# Patient Record
Sex: FEMALE | Race: BLACK OR AFRICAN AMERICAN
[De-identification: names, ages, dates, MRNs, and addresses within clinical notes are randomized per-mention and may not be internally consistent; named-entity substitution may affect disease eponyms.]

---

## 2017-01-16 ENCOUNTER — HOSPITAL ENCOUNTER (OUTPATIENT)
Dept: HOSPITAL 62 - OD | Age: 32
End: 2017-01-16
Attending: INTERNAL MEDICINE
Payer: MEDICAID

## 2017-01-16 DIAGNOSIS — Z72.51: ICD-10-CM

## 2017-01-16 DIAGNOSIS — N25.89: ICD-10-CM

## 2017-01-16 DIAGNOSIS — Z11.3: ICD-10-CM

## 2017-01-16 DIAGNOSIS — Z79.899: Primary | ICD-10-CM

## 2017-01-16 LAB
ALBUMIN SERPL-MCNC: 4 G/DL (ref 3.5–5)
ALP SERPL-CCNC: 99 U/L (ref 38–126)
ALT SERPL-CCNC: 24 U/L (ref 9–52)
ANION GAP SERPL CALC-SCNC: 9 MMOL/L (ref 5–19)
AST SERPL-CCNC: 27 U/L (ref 14–36)
BASOPHILS # BLD AUTO: 0 10^3/UL (ref 0–0.2)
BASOPHILS NFR BLD AUTO: 0.5 % (ref 0–2)
BILIRUB DIRECT SERPL-MCNC: 0 MG/DL (ref 0–0.3)
BILIRUB SERPL-MCNC: 0.5 MG/DL (ref 0.2–1.3)
BUN SERPL-MCNC: 7 MG/DL (ref 7–20)
CALCIUM: 9.1 MG/DL (ref 8.4–10.2)
CHLORIDE SERPL-SCNC: 102 MMOL/L (ref 98–107)
CHOLEST SERPL-MCNC: 192.41 MG/DL (ref 0–200)
CO2 SERPL-SCNC: 29 MMOL/L (ref 22–30)
CREAT SERPL-MCNC: 0.72 MG/DL (ref 0.52–1.25)
DIRECT HDL: 37 MG/DL (ref 40–?)
EOSINOPHIL # BLD AUTO: 0.2 10^3/UL (ref 0–0.6)
EOSINOPHIL NFR BLD AUTO: 2.9 % (ref 0–6)
ERYTHROCYTE [DISTWIDTH] IN BLOOD BY AUTOMATED COUNT: 13.7 % (ref 11.5–14)
GLUCOSE SERPL-MCNC: 73 MG/DL (ref 75–110)
HCT VFR BLD CALC: 41.3 % (ref 36–47)
HGB BLD-MCNC: 13.3 G/DL (ref 12–15.5)
HGB HCT DIFFERENCE: -1.4
LDLC SERPL DIRECT ASSAY-MCNC: 144 MG/DL (ref ?–100)
LYMPHOCYTES # BLD AUTO: 2.6 10^3/UL (ref 0.5–4.7)
LYMPHOCYTES NFR BLD AUTO: 41.7 % (ref 13–45)
MCH RBC QN AUTO: 30.3 PG (ref 27–33.4)
MCHC RBC AUTO-ENTMCNC: 32.2 G/DL (ref 32–36)
MCV RBC AUTO: 94 FL (ref 80–97)
MONOCYTES # BLD AUTO: 0.6 10^3/UL (ref 0.1–1.4)
MONOCYTES NFR BLD AUTO: 9.3 % (ref 3–13)
NEUTROPHILS # BLD AUTO: 2.8 10^3/UL (ref 1.7–8.2)
NEUTS SEG NFR BLD AUTO: 45.6 % (ref 42–78)
POTASSIUM SERPL-SCNC: 4.7 MMOL/L (ref 3.6–5)
PROT SERPL-MCNC: 7.5 G/DL (ref 6.3–8.2)
RBC # BLD AUTO: 4.39 10^6/UL (ref 3.72–5.28)
SODIUM SERPL-SCNC: 140.1 MMOL/L (ref 137–145)
TRIGL SERPL-MCNC: 83 MG/DL (ref ?–150)
VLDLC SERPL CALC-MCNC: 17 MG/DL (ref 10–31)
WBC # BLD AUTO: 6.2 10^3/UL (ref 4–10.5)

## 2017-01-16 PROCEDURE — 84702 CHORIONIC GONADOTROPIN TEST: CPT

## 2017-01-16 PROCEDURE — 84443 ASSAY THYROID STIM HORMONE: CPT

## 2017-01-16 PROCEDURE — 80061 LIPID PANEL: CPT

## 2017-01-16 PROCEDURE — 83036 HEMOGLOBIN GLYCOSYLATED A1C: CPT

## 2017-01-16 PROCEDURE — 86701 HIV-1ANTIBODY: CPT

## 2017-01-16 PROCEDURE — 84560 ASSAY OF URINE/URIC ACID: CPT

## 2017-01-16 PROCEDURE — 86592 SYPHILIS TEST NON-TREP QUAL: CPT

## 2017-01-16 PROCEDURE — 80053 COMPREHEN METABOLIC PANEL: CPT

## 2017-01-16 PROCEDURE — 36415 COLL VENOUS BLD VENIPUNCTURE: CPT

## 2017-01-16 PROCEDURE — 83970 ASSAY OF PARATHORMONE: CPT

## 2017-01-16 PROCEDURE — 85025 COMPLETE CBC W/AUTO DIFF WBC: CPT

## 2017-01-17 LAB
URATE 24H UR-MCNC: 52.1 MG/DL
URATE 24H UR-MRATE: 771.1 MG/24 HR (ref 250–750)

## 2017-01-23 ENCOUNTER — HOSPITAL ENCOUNTER (OUTPATIENT)
Dept: HOSPITAL 62 - OD | Age: 32
End: 2017-01-23
Attending: INTERNAL MEDICINE
Payer: MEDICAID

## 2017-01-23 DIAGNOSIS — Z79.899: Primary | ICD-10-CM

## 2017-01-23 DIAGNOSIS — N20.0: ICD-10-CM

## 2017-01-23 PROCEDURE — 82507 ASSAY OF CITRATE: CPT

## 2017-01-23 PROCEDURE — 83970 ASSAY OF PARATHORMONE: CPT

## 2017-01-23 PROCEDURE — 36415 COLL VENOUS BLD VENIPUNCTURE: CPT

## 2017-01-23 PROCEDURE — 82340 ASSAY OF CALCIUM IN URINE: CPT

## 2017-01-23 PROCEDURE — 82306 VITAMIN D 25 HYDROXY: CPT

## 2017-01-24 LAB
25(OH)D3 SERPL-MCNC: 12.6 NG/ML (ref 30–100)
PTH-INTACT SERPL-MCNC: 69 PG/ML (ref 15–65)

## 2017-03-17 ENCOUNTER — HOSPITAL ENCOUNTER (EMERGENCY)
Dept: HOSPITAL 62 - ER | Age: 32
Discharge: HOME | End: 2017-03-17
Payer: MEDICAID

## 2017-03-17 VITALS — SYSTOLIC BLOOD PRESSURE: 125 MMHG | DIASTOLIC BLOOD PRESSURE: 69 MMHG

## 2017-03-17 DIAGNOSIS — O20.8: ICD-10-CM

## 2017-03-17 DIAGNOSIS — O99.611: ICD-10-CM

## 2017-03-17 DIAGNOSIS — Z3A.09: ICD-10-CM

## 2017-03-17 DIAGNOSIS — R10.13: ICD-10-CM

## 2017-03-17 DIAGNOSIS — O16.1: ICD-10-CM

## 2017-03-17 DIAGNOSIS — Z91.040: ICD-10-CM

## 2017-03-17 DIAGNOSIS — K21.9: ICD-10-CM

## 2017-03-17 DIAGNOSIS — Z79.899: ICD-10-CM

## 2017-03-17 DIAGNOSIS — Z79.3: ICD-10-CM

## 2017-03-17 DIAGNOSIS — Z90.81: ICD-10-CM

## 2017-03-17 DIAGNOSIS — O26.891: Primary | ICD-10-CM

## 2017-03-17 DIAGNOSIS — R11.0: ICD-10-CM

## 2017-03-17 LAB
ALBUMIN SERPL-MCNC: 4.3 G/DL (ref 3.5–5)
ALP SERPL-CCNC: 74 U/L (ref 38–126)
ALT SERPL-CCNC: 25 U/L (ref 9–52)
ANION GAP SERPL CALC-SCNC: 11 MMOL/L (ref 5–19)
APPEARANCE UR: (no result)
AST SERPL-CCNC: 20 U/L (ref 14–36)
BASOPHILS # BLD AUTO: 0 10^3/UL (ref 0–0.2)
BASOPHILS NFR BLD AUTO: 0.4 % (ref 0–2)
BILIRUB DIRECT SERPL-MCNC: 0 MG/DL (ref 0–0.3)
BILIRUB SERPL-MCNC: 0.4 MG/DL (ref 0.2–1.3)
BILIRUB UR QL STRIP: NEGATIVE
BUN SERPL-MCNC: 7 MG/DL (ref 7–20)
CALCIUM: 10.1 MG/DL (ref 8.4–10.2)
CHLORIDE SERPL-SCNC: 104 MMOL/L (ref 98–107)
CO2 SERPL-SCNC: 25 MMOL/L (ref 22–30)
CREAT SERPL-MCNC: 0.6 MG/DL (ref 0.52–1.25)
EOSINOPHIL # BLD AUTO: 0.4 10^3/UL (ref 0–0.6)
EOSINOPHIL NFR BLD AUTO: 3.9 % (ref 0–6)
ERYTHROCYTE [DISTWIDTH] IN BLOOD BY AUTOMATED COUNT: 13.8 % (ref 11.5–14)
GLUCOSE SERPL-MCNC: 78 MG/DL (ref 75–110)
GLUCOSE UR STRIP-MCNC: NEGATIVE MG/DL
HCT VFR BLD CALC: 37.2 % (ref 36–47)
HGB BLD-MCNC: 12.6 G/DL (ref 12–15.5)
HGB HCT DIFFERENCE: 0.6
KETONES UR STRIP-MCNC: (no result) MG/DL
LIPASE SERPL-CCNC: 270.5 U/L (ref 23–300)
LYMPHOCYTES # BLD AUTO: 2.9 10^3/UL (ref 0.5–4.7)
LYMPHOCYTES NFR BLD AUTO: 30.2 % (ref 13–45)
MCH RBC QN AUTO: 32.1 PG (ref 27–33.4)
MCHC RBC AUTO-ENTMCNC: 34 G/DL (ref 32–36)
MCV RBC AUTO: 95 FL (ref 80–97)
MONOCYTES # BLD AUTO: 1.2 10^3/UL (ref 0.1–1.4)
MONOCYTES NFR BLD AUTO: 12.7 % (ref 3–13)
NEUTROPHILS # BLD AUTO: 5 10^3/UL (ref 1.7–8.2)
NEUTS SEG NFR BLD AUTO: 52.8 % (ref 42–78)
NITRITE UR QL STRIP: NEGATIVE
PH UR STRIP: 7 [PH] (ref 5–9)
POTASSIUM SERPL-SCNC: 4.5 MMOL/L (ref 3.6–5)
PROT SERPL-MCNC: 7.9 G/DL (ref 6.3–8.2)
PROT UR STRIP-MCNC: 30 MG/DL
RBC # BLD AUTO: 3.94 10^6/UL (ref 3.72–5.28)
SODIUM SERPL-SCNC: 140 MMOL/L (ref 137–145)
SP GR UR STRIP: 1.03
UROBILINOGEN UR-MCNC: NEGATIVE MG/DL (ref ?–2)
WBC # BLD AUTO: 9.5 10^3/UL (ref 4–10.5)

## 2017-03-17 PROCEDURE — 76817 TRANSVAGINAL US OBSTETRIC: CPT

## 2017-03-17 PROCEDURE — 85025 COMPLETE CBC W/AUTO DIFF WBC: CPT

## 2017-03-17 PROCEDURE — 99284 EMERGENCY DEPT VISIT MOD MDM: CPT

## 2017-03-17 PROCEDURE — 36415 COLL VENOUS BLD VENIPUNCTURE: CPT

## 2017-03-17 PROCEDURE — 80053 COMPREHEN METABOLIC PANEL: CPT

## 2017-03-17 PROCEDURE — 81001 URINALYSIS AUTO W/SCOPE: CPT

## 2017-03-17 PROCEDURE — 84702 CHORIONIC GONADOTROPIN TEST: CPT

## 2017-03-17 PROCEDURE — 83690 ASSAY OF LIPASE: CPT

## 2017-03-17 NOTE — ER DOCUMENT REPORT
ED Medical Screen (RME)





- General


Stated Complaint: STOMACH PAIN


Notes: 


Patient states she has been having abdominal pain, with nausea for the last 6 

weeks.  Seen by her primary care physician and put on Nexium.  Patient states 

last Friday and Saturday she noticed black tarry stools.  Denies further 

incidents.  Patient's complains of bloating, states she can't eat anything.  

Symptoms have gotten worse over the last week.  No fever





I have greeted and performed a rapid initial assessment of this patient.  A 

comprehensive ED assessment and evaluation of the patient, analysis of test 

results and completion of the medical decision making process will be conducted 

by additional ED providers.


TRAVEL OUTSIDE OF THE [place] IN LAST 30 DAYS: No





Past Medical History





- Social History


Family history: Arthritis, DM, Hypertension, Malignancy





- Past Medical History


Cardiac Medical History: Reports: Hx Hypertension


Pulmonary Medical History: Reports: Hx Bronchitis, Hx Pneumonia


Neurological Medical History: Reports: Hx Migraine


Renal/ Medical History: Reports: Hx Kidney Stones


Musculoskeltal Medical History: Reports Hx Musculoskeletal Trauma


Psychiatric Medical History: Reports: Hx Anxiety, Hx Depression


Past Surgical History: Reports: Hx Abdominal Surgery - splenectomy, Hx 

Adenoidectomy, Hx  Section - X2, Hx Nose Surgery, Hx Tonsillectomy





- Immunizations


Immunizations up to date: Yes


Hx Diphtheria, Pertussis, Tetanus Vaccination: Yes





Physical Exam





- Vital signs


Vitals: 





 











Temp Pulse Resp BP Pulse Ox


 


 98.9 F   80   16   127/73 H  98 


 


 17 11:02  17 11:02  17 11:02  17 11:02  17 11:02














- Abdominal


Bowel sounds: Normal


Tenderness: Tender - diffuse, abd soft





Course





- Vital Signs


Vital signs: 





 











Temp Pulse Resp BP Pulse Ox


 


 98.9 F   80   16   127/73 H  98 


 


 17 11:02  17 11:02  17 11:02  17 11:02  17 11:02

## 2017-03-17 NOTE — ER DOCUMENT REPORT
ED General





- General


Chief Complaint: Abdominal Pain


Stated Complaint: STOMACH PAIN


Mode of Arrival: Ambulatory


Information source: Patient


Notes: 


This is a 32-year-old female who presents with 6 weeks of intermittent 

abdominal discomfort.  She states that she has had epigastric cramping pain and 

was prescribed Nexium 3 weeks ago which she does not feel is helping.  She has 

had frequent nausea but no vomiting.  She frequently has worsening abdominal 

pain after eating.  She also feels that her abdomen is distended.  She denies 

any fevers chills or systemic symptoms.  She has had no dysuria.  She states 

that her last period was sometime in January or maybe December that she is on 

Depo currently.


TRAVEL OUTSIDE OF THE [place] IN LAST 30 DAYS: No





- Related Data


Allergies/Adverse Reactions: 


 





latex Allergy (Verified 17 11:55)


 


aspirin Adverse Reaction (Verified 17 11:55)


 VOMITING











Past Medical History





- General


Information source: Patient





- Social History


Smoking Status: Never Smoker


Chew tobacco use (# tins/day): No


Frequency of alcohol use: Social


Drug Abuse: None


Family History: Reviewed & Not Pertinent


Patient has suicidal ideation: No


Patient has homicidal ideation: No





- Past Medical History


Cardiac Medical History: Reports: Hx Hypertension


Pulmonary Medical History: Reports: Hx Bronchitis, Hx Pneumonia


Neurological Medical History: Reports: Hx Migraine


Renal/ Medical History: Reports: Hx Kidney Stones.  Denies: Hx Peritoneal 

Dialysis


GI Medical History: Reports: Hx Gastroesophageal Reflux Disease


Musculoskeltal Medical History: Reports Hx Musculoskeletal Trauma


Psychiatric Medical History: Reports: Hx Anxiety, Hx Bipolar Disorder, Hx 

Depression


Past Surgical History: Reports: Hx Abdominal Surgery - splenectomy, Hx 

Adenoidectomy, Hx  Section - X2, Hx Nose Surgery, Hx Tonsillectomy





- Immunizations


Immunizations up to date: Yes


Hx Diphtheria, Pertussis, Tetanus Vaccination: Yes


Hx Pneumococcal Vaccination: 10/10/11





Review of Systems





- Review of Systems


Notes: 


REVIEW OF SYSTEMS:


CONSTITUTIONAL :  Denies fever,  chills, or sweats.  Denies recent illness.


EENT:   Denies eye, ear, throat, or mouth pain or symptoms.  Denies nasal or 

sinus congestion.


CARDIOVASCULAR:  Denies chest pain.


RESPIRATORY:  Denies cough, cold, or chest congestion.  Denies shortness of 

breath, difficulty breathing, or wheezing.


GASTROINTESTINAL: As per history of present illness


GENITOURINARY:  Denies difficulty urinating, painful urination, burning, 

frequency, or blood in urine.


MUSCULOSKELETAL:  Denies neck or back pain or joint pain or swelling.


SKIN:   Denies rash or skin lesions.


HEMATOLOGIC :   Denies easy bruising or bleeding.


LYMPHATIC:  Denies swollen, enlarged glands.


NEUROLOGICAL:  Denies altered mental status or loss of consciousness.  Denies 

headache.


PSYCHIATRIC:  Denies anxiety or stress or depression.


ALL OTHER SYSTEMS REVIEWED AND NEGATIVE.





Physical Exam





- Vital signs


Vitals: 


 











Temp Pulse Resp BP Pulse Ox


 


 98.9 F   80   16   127/73 H  98 


 


 17 11:02  17 11:02  17 11:02  17 11:02  17 11:02














- Notes


Notes: 


PHYSICAL EXAMINATION:





GENERAL: Well-appearing, well-nourished and in no acute distress.  Very 

pleasant and conversant





HEAD: Atraumatic, normocephalic.





EYES: Pupils equal round and reactive to light, extraocular movements intact, 

sclera anicteric, conjunctiva are normal.





ENT: nares patent, oropharynx clear without exudates.  Moist mucous membranes.





NECK: Normal range of motion, supple without lymphadenopathy





LUNGS: Breath sounds clear to auscultation bilaterally and equal.  No wheezes 

rales or rhonchi.





HEART: Regular rate and rhythm without murmurs





ABDOMEN: Soft, nontender, normoactive bowel sounds.  No guarding, no rebound.  

No masses appreciated.





EXTREMITIES: Normal range of motion, no pitting or edema.  No cyanosis.





NEUROLOGICAL: Cranial nerves grossly intact.  Normal speech.  No gross focal 

motor or sensory deficit.





PSYCH: Normal mood, normal affect.





SKIN: Warm, Dry, normal turgor, no rashes or lesions noted.





Course





- Re-evaluation


Re-evalutation: 





17 16:09


Discussed diagnosis of pregancy with patient, which time course coincides with 

6 weeks of nausea and abdominal discomfort.  US reassuring for IUP at 9+6, 

although a subchorionic hemorrhage was identified.  Labs reassuring.  Patient 

instructed to follow up with women's health for OB care and she will begin 

taking prenatal vitamins.  Strict return precautions were discussed and she is 

very comfortable with the plan.





- Vital Signs


Vital signs: 


 











Temp Pulse Resp BP Pulse Ox


 


 98.9 F   80   18   127/73 H  98 


 


 17 11:02  17 11:02  17 13:25  17 11:02  17 11:02














- Laboratory


Result Diagrams: 


 17 12:00





 17 12:00


Laboratory results interpreted by me: 


 











  17





  12:00 12:00


 


Beta HCG, Quant  728732.00 H 


 


Urine Protein   30 H


 


Urine Ketones   TRACE H














Discharge





- Discharge


Clinical Impression: 


 First trimester pregnancy, Nausea





Condition: Stable


Disposition: HOME, SELF-CARE


Additional Instructions: 


Pregnancy





     You are pregnant.  Prenatal care is best started as early in pregnancy as 

possible.


     If you're unsure about continuing this pregnancy, you should discuss this 

with your physician or with counsellors at [facility].


     You should take only medications approved by your physician. Acetaminophen 

can safely be taken for minor pains.  As a rule, medication for chronic 

conditions such as asthma or seizures can safely be continued.  You should 

discuss with the physician every medicine you take.


     Any regular exercise program can be continued.  Talk to your physician, 

however, before engaging in competitive or demanding sports.


     Alcohol, smoking, and "street drugs" are dangerous to your baby. Cocaine 

is especially dangerous.  Don't use any illicit drugs!











Reflux Disease (GERD)





     Gastro-Esophageal Reflux Disease (GERD) is caused by stomach acid 

refluxing back up into the esophagus. The valve at the end of the esophagus may 

be weak. This is common in persons with a hiatal hernia. GERD symptoms can 

include indigestion, chest pain, heartburn, or food "sticking." Certain foods, 

alcohol, and aspirin can make GERD worse.


     Treatment depends on the severity. Usually, antacids or acid-suppressing 

medicines are used. When the esophagus is acutely inflamed, the physician will 

often prescribe membrane-protective drugs such as Carafate.  Some patients 

benefit from medication such as Reglan that tightens the valve at the top of 

the stomach.


     Avoid those foods that bring on your symptoms. For many people, these 

foods are coffee, chocolate, onions, garlic, and carbonated drinks. Don't use 

alcohol, aspirin, caffeine, or tobacco. Don't eat late at night -- within 4 

hours of bedtime. Don't over-eat. If necessary, elevate the head of your bed 

about 4 inches so that stomach acid will not roll up into your esophagus.


     Call the doctor if you develop severe chest pain, inability to swallow 

fluids, fever, or worsening symptoms.




















Prescriptions: 


Prenatal Vit No.129/Iron/FA [Prenatal Tablet] 1 each PO DAILY #30 tablet

## 2017-03-24 ENCOUNTER — HOSPITAL ENCOUNTER (OUTPATIENT)
Dept: HOSPITAL 62 - RAD | Age: 32
End: 2017-03-24
Attending: INTERNAL MEDICINE
Payer: MEDICAID

## 2017-03-24 DIAGNOSIS — R10.11: ICD-10-CM

## 2017-03-24 DIAGNOSIS — R10.13: Primary | ICD-10-CM

## 2017-03-24 PROCEDURE — 76705 ECHO EXAM OF ABDOMEN: CPT

## 2017-12-21 ENCOUNTER — HOSPITAL ENCOUNTER (EMERGENCY)
Dept: HOSPITAL 62 - ER | Age: 32
Discharge: HOME | End: 2017-12-21
Payer: MEDICAID

## 2017-12-21 VITALS — DIASTOLIC BLOOD PRESSURE: 71 MMHG | SYSTOLIC BLOOD PRESSURE: 119 MMHG

## 2017-12-21 DIAGNOSIS — Z91.040: ICD-10-CM

## 2017-12-21 DIAGNOSIS — J34.89: ICD-10-CM

## 2017-12-21 DIAGNOSIS — I10: ICD-10-CM

## 2017-12-21 DIAGNOSIS — R09.81: ICD-10-CM

## 2017-12-21 DIAGNOSIS — K04.7: Primary | ICD-10-CM

## 2017-12-21 PROCEDURE — 99282 EMERGENCY DEPT VISIT SF MDM: CPT

## 2017-12-21 NOTE — ER DOCUMENT REPORT
ED General





- General


Chief Complaint: Mouth Problem


Stated Complaint: MOUTH PAIN AND SORE THROAT


Time Seen by Provider: 17 22:59


Notes: 





32-year-old female presents with congestion of the ears and sinuses bilaterally 

for 2 weeks off and on with no fevers or chills, similar prior sinus infections

, refractory to Benadryl.  She has no fevers or chills.  She also has a sore 

right lower molar with "abscess" for 3 days.  Just with the [place] has no 

primary care provider.  Eating and drinking fine.


TRAVEL OUTSIDE OF THE [place] IN LAST 30 DAYS: No





- Related Data


Allergies/Adverse Reactions: 


 





latex Allergy (Verified 17 11:55)


 


aspirin Adverse Reaction (Verified 17 11:55)


 VOMITING











Past Medical History





- Social History


Smoking Status: Never Smoker


Family History: Reviewed & Not Pertinent


Patient has suicidal ideation: No


Patient has homicidal ideation: No





- Past Medical History


Cardiac Medical History: Reports: Hx Hypertension


Pulmonary Medical History: Reports: Hx Bronchitis, Hx Pneumonia


Neurological Medical History: Reports: Hx Migraine


Renal/ Medical History: Reports: Hx Kidney Stones.  Denies: Hx Peritoneal 

Dialysis


GI Medical History: Reports: Hx Gastroesophageal Reflux Disease


Musculoskeltal Medical History: Reports Hx Musculoskeletal Trauma


Psychiatric Medical History: Reports: Hx Anxiety, Hx Bipolar Disorder, Hx 

Depression


Past Surgical History: Reports: Hx Abdominal Surgery - splenectomy, Hx 

Adenoidectomy, Hx  Section - X2, Hx Nose Surgery, Hx Tonsillectomy





- Immunizations


Immunizations up to date: Yes


Hx Diphtheria, Pertussis, Tetanus Vaccination: Yes


Hx Pneumococcal Vaccination: 10/10/11





Review of Systems





- Review of Systems


Notes: 





REVIEW OF SYSTEMS





GEN: Denies fever, chills, weight loss


ENT: Sinus pain nasal discharge or congestion mouth pain


EYES: Denies blurry vision, eye pain, discharge


CV: Denies chest pain, palpitations, edema


RESP: Denies cough, shortness of breath, wheezing


GI: Denies abdominal pain, nausea, vomiting, diarrhea


MSK: Denies joint pain/swelling, edema, 


SKIN: Denies rash, skin lesions


LYMPH: Denies swollen glands/lymph nodes


NEURO: Denies headache, focal weakness or numbness, dizziness


PSYCH: Denies depression, suicidal or homicidal ideation








PHYSICAL EXAMINATION





General: No acute distress, well-nourished


Head: Atraumatic, normocephalic


ENT: Mouth normal, oropharynx moist, no exudates or tonsillar enlargement no 

sinus tenderness.  Punctate right lower periapical abscess which drained 

spontaneously on palpation, with minimal fullness of the vestibule and no 

tenderness at the right lower molar


Eyes: Conjunctiva normal, pupils equal, lids normal


Neck: No JVD, supple, no guarding


CVS: Normal rate, regular rhythm, no murmurs


Resp: No resp distress, equal and normal breath sounds bilaterally


GI: Nondistended, soft, no tenderness to palpation, no rebound or guarding


Ext: No deformities, no edema, normal range of motion in upper and lower ext


Back: No CVA or midline TTP


Skin: No rash, warm


Lymphatic: No lymphadeopathy noted


Neuro: Awake, alert.  Face symmetric.  GCS 15.





Physical Exam





- Vital signs


Vitals: 





 











Temp Pulse Resp BP Pulse Ox


 


 98.2 F   67   18   119/71   99 


 


 17 21:35  17 21:35  17 21:35  17 21:35  17 21:35














Course





- Re-evaluation


Re-evalutation: 





17 23:00


Tiny periapical abscess spontaneous drainage likely from a tooth infection 

along with sinus congestion which is probably viral.


No evidence of airway obstruction or need for ED drainage.


Augmentin, nasal steroids, discharge


I have discussed with the patient there likely diagnosis, aftercare plan, follow

-up plans and my usual and customary return precautions.  They verbalized 

understanding of this.





- Vital Signs


Vital signs: 





 











Temp Pulse Resp BP Pulse Ox


 


 98.2 F   67   18   119/71   99 


 


 17 21:35  17 21:35  17 21:35  17 21:35  17 21:35














Discharge





- Discharge


Clinical Impression: 


 Periapical abscess, Sinus congestion





Condition: Good


Disposition: HOME, SELF-CARE


Instructions:  Abscess ([facility])


Prescriptions: 


Amox Tr/Potassium Clavulanate [Augmentin 875-125 Tablet] 1 tab PO BID 10 Days  

tablet


Amoxicillin/Potassium Clav [Amox-Clav ER 1,000-62.5 mg Tab] 1 each PO BID #14 

tab.er.12h


Fluticasone Propionate [Flonase Nasal Spray 50 Mcg/Spray 16 gm] 1 spray NASL 

Q12 #1 inhaler

## 2018-09-01 ENCOUNTER — HOSPITAL ENCOUNTER (EMERGENCY)
Dept: HOSPITAL 62 - ER | Age: 33
LOS: 1 days | Discharge: HOME | End: 2018-09-02
Payer: OTHER GOVERNMENT

## 2018-09-01 DIAGNOSIS — B37.3: ICD-10-CM

## 2018-09-01 DIAGNOSIS — R35.0: ICD-10-CM

## 2018-09-01 DIAGNOSIS — R30.0: ICD-10-CM

## 2018-09-01 DIAGNOSIS — N76.0: Primary | ICD-10-CM

## 2018-09-01 DIAGNOSIS — N39.0: ICD-10-CM

## 2018-09-01 DIAGNOSIS — B96.89: ICD-10-CM

## 2018-09-01 DIAGNOSIS — N73.9: ICD-10-CM

## 2018-09-01 LAB
APPEARANCE UR: (no result)
APTT PPP: YELLOW S
BILIRUB UR QL STRIP: NEGATIVE
GLUCOSE UR STRIP-MCNC: NEGATIVE MG/DL
KETONES UR STRIP-MCNC: NEGATIVE MG/DL
NITRITE UR QL STRIP: NEGATIVE
PH UR STRIP: 6 [PH] (ref 5–9)
PROT UR STRIP-MCNC: 30 MG/DL
SP GR UR STRIP: 1.03
UROBILINOGEN UR-MCNC: 4 MG/DL (ref ?–2)

## 2018-09-01 PROCEDURE — 81025 URINE PREGNANCY TEST: CPT

## 2018-09-01 PROCEDURE — 87491 CHLMYD TRACH DNA AMP PROBE: CPT

## 2018-09-01 PROCEDURE — 99283 EMERGENCY DEPT VISIT LOW MDM: CPT

## 2018-09-01 PROCEDURE — 87210 SMEAR WET MOUNT SALINE/INK: CPT

## 2018-09-01 PROCEDURE — 87591 N.GONORRHOEAE DNA AMP PROB: CPT

## 2018-09-01 PROCEDURE — 81001 URINALYSIS AUTO W/SCOPE: CPT

## 2018-09-01 PROCEDURE — 96372 THER/PROPH/DIAG INJ SC/IM: CPT

## 2018-09-01 PROCEDURE — 87086 URINE CULTURE/COLONY COUNT: CPT

## 2018-09-01 NOTE — ER DOCUMENT REPORT
HPI





- HPI


Patient complains to provider of: Vaginal discharge


Onset: Other - 2 days


Onset/Duration: Persistent


Quality of pain: Burning


Pain Level: 3


Context: 





Patient presents complaining of vaginal discharge with odor.  Patient denies 

any concern about pregnancy.  Patient does report urinary frequency and 

dysuria.  Patient denies any fever, nausea or vomiting.


Associated Symptoms: Other - Dysuria, vaginal discharge.  denies: Fever


Exacerbated by: Denies


Relieved by: Denies


Similar symptoms previously: Yes


Recently seen / treated by doctor: No





- ROS


ROS below otherwise negative: Yes


Systems Reviewed and Negative: Yes All other systems reviewed and negative





- CONSTITUTIONAL


Constitutional: DENIES: Fever, Chills





- EENT


EENT: DENIES: Sore Throat





- GASTROINTESTINAL


Gastrointestinal: DENIES: Abdominal Pain





- URINARY


Urinary: REPORTS: Dysuria





- REPRODUCTIVE


Reproductive: REPORTS: Abnormal bleeding / discharge.  DENIES: Pregnant:





- MUSCULOSKELETAL


Musculoskeletal: DENIES: Back Pain





- DERM


Skin Color: Normal


Skin Problems: None





Past Medical History





- General


Information source: Patient





- Social History


Smoking Status: Never Smoker


Chew tobacco use (# tins/day): No


Frequency of alcohol use: Social


Drug Abuse: None


Occupation: None


Family History: Reviewed & Not Pertinent


Patient has suicidal ideation: No


Patient has homicidal ideation: No





- Past Medical History


Cardiac Medical History: 


Pulmonary Medical History: Reports: Hx Bronchitis, Hx Pneumonia


Neurological Medical History: Reports: Hx Migraine


Renal/ Medical History: Reports: Hx Kidney Stones.  Denies: Hx Peritoneal 

Dialysis


GI Medical History: Reports: Hx Gastroesophageal Reflux Disease


Musculoskeletal Medical History: Reports Hx Musculoskeletal Trauma


Psychiatric Medical History: Reports: Hx Anxiety, Hx Bipolar Disorder, Hx 

Depression


Past Surgical History: Reports: Hx Abdominal Surgery - splenectomy, Hx 

Adenoidectomy, Hx  Section - X3, Hx Nose Surgery, Hx Tonsillectomy





- Immunizations


Immunizations up to date: Yes


Hx Diphtheria, Pertussis, Tetanus Vaccination: Yes


Hx Pneumococcal Vaccination: 10/10/11





Vertical Provider Document





- CONSTITUTIONAL


Agree With Documented VS: Yes


Exam Limitations: No Limitations


General Appearance: WD/WN, No Apparent Distress





- INFECTION CONTROL


TRAVEL OUTSIDE OF THE [place] IN LAST 30 DAYS: No





- HEENT


HEENT: Atraumatic, Normocephalic





- NECK


Neck: Normal Inspection, Supple





- RESPIRATORY


Respiratory: Breath Sounds Normal, No Respiratory Distress





- CARDIOVASCULAR


Cardiovascular: Regular Rate, Regular Rhythm





- GI/ABDOMEN


Gastrointestinal: Abdomen Soft, Abdomen Tender - suprapubic





- REPRODUCTIVE


Female Genitalia: Abnormal Inspection, CMT.  negative: Adnexal Pain-Right, 

Adnexal Pain-Left


Notes: 





Large amount of white, green vaginal discharge





- BACK


Back: Normal Inspection.  negative: CVA Tenderness-Right, CVA Tenderness-Left





- MUSCULOSKELETAL/EXTREMETIES


Musculoskeletal/Extremeties: MAEW, FROM





- NEURO


Level of Consciousness: Awake, Alert, Appropriate


Motor/Sensory: No Motor Deficit





- DERM


Integumentary: Warm, Dry, No Rash





Course





- Laboratory


Laboratory results interpreted by me: 





18 02:08


 Labs- Entire Visit











  18





  21:33 00:18


 


Urine Color  YELLOW 


 


Urine Appearance  SLIGHTLY-CLOUDY 


 


Urine pH  6.0 


 


Ur Specific Gravity  1.031 


 


Urine Protein  30 H 


 


Urine Glucose (UA)  NEGATIVE 


 


Urine Ketones  NEGATIVE 


 


Urine Blood  SMALL H 


 


Urine Nitrite  NEGATIVE 


 


Urine Bilirubin  NEGATIVE 


 


Urine Urobilinogen  4.0 H 


 


Ur Leukocyte Esterase  LARGE H 


 


Urine WBC (Auto)  10 


 


Urine RBC (Auto)  18 


 


U Hyaline Cast (Auto)  1 


 


Urine Bacteria (Auto)  TRACE 


 


Squamous Epi Cells Auto  6 


 


Urine Mucus (Auto)  RARE 


 


Urine Ascorbic Acid  NEGATIVE 


 


Urine HCG, Qual  NEGATIVE 


 


Epi Cells (Wet Prep)   3+ EPITHELIALS SEEN


 


Bacteria (Wet Prep)   4+ BACTERIA SEEN


 


Trichomonas (Wet Prep)   NO TRICHOMONAS SEEN


 


Vaginal WBC   3+ WBCS SEEN


 


Vaginal RBC   RARE RBCS SEEN


 


Vaginal Yeast   BUDDING YEAST SEEN














Discharge





- Discharge


Clinical Impression: 


 Vaginal discharge, PID (acute pelvic inflammatory disease), Bacterial vaginosis

, Yeast vaginitis





UTI (urinary tract infection)


Qualifiers:


 Urinary tract infection type: site unspecified Hematuria presence: with 

hematuria Qualified Code(s): N39.0 - Urinary tract infection, site not specified





Condition: Stable


Disposition: HOME, SELF-CARE


Instructions:  Doxycycline ([facility]), Metronidazole ([facility]), Pelvic Inflammatory 

Disease ([facility]), Rocephin ([facility]), Urinary Tract Infection ([facility]), Vaginal Yeast 

Infection ([facility])


Additional Instructions: 


Return immediately for any new or worsening symptoms





Followup with your primary care provider, call tomorrow to make a followup 

appointment





Cultures are pending, we will call if you need any different treatment


Prescriptions: 


Doxycycline Hyclate 100 mg PO BID #28 capsule


Metronidazole [Flagyl 500 mg Tablet] 500 mg PO BID #14 tablet


Referrals: 


GILBERT,STORMY, FNP-C [NO LOCAL MD] - Follow up as needed

## 2018-09-02 VITALS — SYSTOLIC BLOOD PRESSURE: 125 MMHG | DIASTOLIC BLOOD PRESSURE: 78 MMHG

## 2018-09-02 LAB
BACTERIA (WET MOUNT): (no result)
CHLAM PCR: NOT DETECTED
EPITHELIALS (WET MOUNT): (no result)
GON PCR: NOT DETECTED
RBCS (WET MOUNT): (no result)
T.VAGINALIS (WET MOUNT): (no result)
WBCS (WET MOUNT): (no result)
YEAST (WET MOUNT): (no result)

## 2018-11-09 ENCOUNTER — HOSPITAL ENCOUNTER (EMERGENCY)
Dept: HOSPITAL 62 - ER | Age: 33
Discharge: HOME | End: 2018-11-09
Payer: OTHER GOVERNMENT

## 2018-11-09 VITALS — DIASTOLIC BLOOD PRESSURE: 83 MMHG | SYSTOLIC BLOOD PRESSURE: 130 MMHG

## 2018-11-09 DIAGNOSIS — B96.89: ICD-10-CM

## 2018-11-09 DIAGNOSIS — Z3A.00: ICD-10-CM

## 2018-11-09 DIAGNOSIS — O23.591: Primary | ICD-10-CM

## 2018-11-09 DIAGNOSIS — Z87.442: ICD-10-CM

## 2018-11-09 DIAGNOSIS — R30.0: ICD-10-CM

## 2018-11-09 DIAGNOSIS — Z91.040: ICD-10-CM

## 2018-11-09 DIAGNOSIS — R10.30: ICD-10-CM

## 2018-11-09 DIAGNOSIS — O26.891: ICD-10-CM

## 2018-11-09 DIAGNOSIS — O16.1: ICD-10-CM

## 2018-11-09 LAB
ADD MANUAL DIFF: NO
ALBUMIN SERPL-MCNC: 4.1 G/DL (ref 3.5–5)
ALP SERPL-CCNC: 68 U/L (ref 38–126)
ALT SERPL-CCNC: 14 U/L (ref 9–52)
ANION GAP SERPL CALC-SCNC: 13 MMOL/L (ref 5–19)
APPEARANCE UR: (no result)
APTT PPP: YELLOW S
AST SERPL-CCNC: 21 U/L (ref 14–36)
BACTERIA (WET MOUNT): (no result)
BASOPHILS # BLD AUTO: 0 10^3/UL (ref 0–0.2)
BASOPHILS NFR BLD AUTO: 0.3 % (ref 0–2)
BILIRUB DIRECT SERPL-MCNC: 0.2 MG/DL (ref 0–0.4)
BILIRUB SERPL-MCNC: 0.3 MG/DL (ref 0.2–1.3)
BILIRUB UR QL STRIP: NEGATIVE
BUN SERPL-MCNC: 8 MG/DL (ref 7–20)
CALCIUM: 9.5 MG/DL (ref 8.4–10.2)
CHLAM PCR: NOT DETECTED
CHLORIDE SERPL-SCNC: 103 MMOL/L (ref 98–107)
CO2 SERPL-SCNC: 23 MMOL/L (ref 22–30)
EOSINOPHIL # BLD AUTO: 0.2 10^3/UL (ref 0–0.6)
EOSINOPHIL NFR BLD AUTO: 1.9 % (ref 0–6)
EPITHELIALS (WET MOUNT): (no result)
ERYTHROCYTE [DISTWIDTH] IN BLOOD BY AUTOMATED COUNT: 13.2 % (ref 11.5–14)
GLUCOSE SERPL-MCNC: 87 MG/DL (ref 75–110)
GLUCOSE UR STRIP-MCNC: NEGATIVE MG/DL
GON PCR: NOT DETECTED
HCT VFR BLD CALC: 36.9 % (ref 36–47)
HGB BLD-MCNC: 12.5 G/DL (ref 12–15.5)
KETONES UR STRIP-MCNC: NEGATIVE MG/DL
LIPASE SERPL-CCNC: 191.8 U/L (ref 23–300)
LYMPHOCYTES # BLD AUTO: 3.5 10^3/UL (ref 0.5–4.7)
LYMPHOCYTES NFR BLD AUTO: 35.6 % (ref 13–45)
MCH RBC QN AUTO: 32.1 PG (ref 27–33.4)
MCHC RBC AUTO-ENTMCNC: 33.9 G/DL (ref 32–36)
MCV RBC AUTO: 95 FL (ref 80–97)
MONOCYTES # BLD AUTO: 1.1 10^3/UL (ref 0.1–1.4)
MONOCYTES NFR BLD AUTO: 10.8 % (ref 3–13)
NEUTROPHILS # BLD AUTO: 5.1 10^3/UL (ref 1.7–8.2)
NEUTS SEG NFR BLD AUTO: 51.4 % (ref 42–78)
NITRITE UR QL STRIP: NEGATIVE
PH UR STRIP: 6 [PH] (ref 5–9)
PLATELET # BLD: 374 10^3/UL (ref 150–450)
POTASSIUM SERPL-SCNC: 4.4 MMOL/L (ref 3.6–5)
PROT SERPL-MCNC: 7.6 G/DL (ref 6.3–8.2)
PROT UR STRIP-MCNC: NEGATIVE MG/DL
RBC # BLD AUTO: 3.89 10^6/UL (ref 3.72–5.28)
RBCS (WET MOUNT): (no result)
SODIUM SERPL-SCNC: 138.5 MMOL/L (ref 137–145)
SP GR UR STRIP: 1.02
T.VAGINALIS (WET MOUNT): (no result)
TOTAL CELLS COUNTED % (AUTO): 100 %
UROBILINOGEN UR-MCNC: NEGATIVE MG/DL (ref ?–2)
WBC # BLD AUTO: 9.8 10^3/UL (ref 4–10.5)
WBCS (WET MOUNT): (no result)
YEAST (WET MOUNT): (no result)

## 2018-11-09 PROCEDURE — 87591 N.GONORRHOEAE DNA AMP PROB: CPT

## 2018-11-09 PROCEDURE — 87210 SMEAR WET MOUNT SALINE/INK: CPT

## 2018-11-09 PROCEDURE — 76817 TRANSVAGINAL US OBSTETRIC: CPT

## 2018-11-09 PROCEDURE — 36415 COLL VENOUS BLD VENIPUNCTURE: CPT

## 2018-11-09 PROCEDURE — 87491 CHLMYD TRACH DNA AMP PROBE: CPT

## 2018-11-09 PROCEDURE — 80053 COMPREHEN METABOLIC PANEL: CPT

## 2018-11-09 PROCEDURE — 84702 CHORIONIC GONADOTROPIN TEST: CPT

## 2018-11-09 PROCEDURE — 83690 ASSAY OF LIPASE: CPT

## 2018-11-09 PROCEDURE — 99284 EMERGENCY DEPT VISIT MOD MDM: CPT

## 2018-11-09 PROCEDURE — 81001 URINALYSIS AUTO W/SCOPE: CPT

## 2018-11-09 PROCEDURE — 85025 COMPLETE CBC W/AUTO DIFF WBC: CPT

## 2018-11-09 NOTE — ER DOCUMENT REPORT
ED Medical Screen (RME)





- General


Chief Complaint: Urinary Problem


Stated Complaint: ABDOMINAL PAINS,NAUSEA


Time Seen by Provider: 18 20:07


Notes: 





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 

episodes of vaginal itching and discharge foul-smelling and lower abdominal 

pain.


TRAVEL OUTSIDE OF THE [place] IN LAST 30 DAYS: No





- Related Data


Allergies/Adverse Reactions: 


 





latex Allergy (Verified 18 19:25)


 


aspirin Adverse Reaction (Verified 18 19:25)


 VOMITING











Past Medical History





- Social History


Family history: Arthritis, DM, Hypertension, Malignancy





- Past Medical History


Cardiac Medical History: Reports: Hx Hypertension


Pulmonary Medical History: Reports: Hx Bronchitis, Hx Pneumonia


Neurological Medical History: Reports: Hx Migraine


Renal/ Medical History: Reports: Hx Kidney Stones.  Denies: Hx Peritoneal 

Dialysis


GI Medical History: Reports: Hx Gastroesophageal Reflux Disease


Musculoskeltal Medical History: Reports Hx Musculoskeletal Trauma


Psychiatric Medical History: Reports: Hx Anxiety, Hx Bipolar Disorder, Hx 

Depression


Past Surgical History: Reports: Hx Abdominal Surgery - splenectomy, Hx 

Adenoidectomy, Hx  Section - X3, Hx Nose Surgery, Hx Tonsillectomy





- Immunizations


Immunizations up to date: Yes


Hx Diphtheria, Pertussis, Tetanus Vaccination: Yes





Physical Exam





- Vital signs


Vitals: 





 











Temp Pulse Resp BP Pulse Ox


 


 98.7 F   67   16   116/76   97 


 


 18 19:45  18 19:45  18 19:45  18 19:45  18 19:45














Course





- Vital Signs


Vital signs: 





 











Temp Pulse Resp BP Pulse Ox


 


 98.7 F   67   16   116/76   97 


 


 18 19:45  18 19:45  18 19:45  18 19:45  18 19:45

## 2018-11-09 NOTE — RADIOLOGY REPORT (SQ)
US PELVIS



HISTORY: Early pregnancy. Pelvic pain.



COMPARISON: None.



TECHNIQUE: Grayscale, color Doppler, and spectral Doppler

ultrasound images of the pelvis were obtained.



FINDINGS: 



There is an intrauterine gestational sac with a yolk sac and

fetal pole visualized.  The crown-rump length measures 2.34 cm,

which corresponds to 9 weeks 0 days of pregnancy. Fetal heart

rate is 157 bpm. Cervix is closed and measures 3.6 cm in length.



The right ovary measures 2.9 x 2.2 cm. The left ovary measures

2.3 x 1.8 cm. Both ovaries contain normal follicles. Normal color

Doppler flow is seen in both ovaries.



No pelvic free fluid is seen.



IMPRESSION:



Single live IUP with estimated gestational age 9 weeks 0 days.

## 2018-11-09 NOTE — ER DOCUMENT REPORT
ED General





- General


Chief Complaint: Urinary Problem


Stated Complaint: ABDOMINAL PAINS,NAUSEA


Time Seen by Provider: 18 20:07


Notes: 





Patient is a 33-year old female with no chronic medical problems, has had 3 

previous pregnancies who presents with 2 concerns.  Her first concern is 3-4 

days of intermittent cramping to her lower abdomen.  She describes this as a 

mild, aching, cramping pain.  Nothing improves or worsens this pain.  The 

patient states that she believes she may be pregnant.  The patient is also 

complaining about a mildly malodorous vaginal discharge similar to when she has 

had bacterial vaginosis in the past.  She denies any vaginal bleeding.  Does 

note some dysuria.  Nothing seems to improve or worsen the above symptoms.  She 

has not seen her general doctor or OB/GYN regarding today's concerns.  She 

denies fever or constitutional symptoms.


TRAVEL OUTSIDE OF THE [place] IN LAST 30 DAYS: No





- Related Data


Allergies/Adverse Reactions: 


 





latex Allergy (Verified 18 19:25)


 


aspirin Adverse Reaction (Verified 18 19:25)


 VOMITING











Past Medical History





- General


Information source: Patient





- Social History


Smoking Status: Never Smoker


Chew tobacco use (# tins/day): No


Frequency of alcohol use: None


Drug Abuse: None


Lives with: Family


Family History: Reviewed & Not Pertinent


Patient has suicidal ideation: No


Patient has homicidal ideation: No





- Past Medical History


Cardiac Medical History: Reports: Hx Hypertension


Pulmonary Medical History: Reports: Hx Bronchitis, Hx Pneumonia


Neurological Medical History: Reports: Hx Migraine


Renal/ Medical History: Reports: Hx Kidney Stones.  Denies: Hx Peritoneal 

Dialysis


GI Medical History: Reports: Hx Gastroesophageal Reflux Disease


Musculoskeletal Medical History: Reports Hx Musculoskeletal Trauma


Psychiatric Medical History: Reports: Hx Anxiety, Hx Bipolar Disorder, Hx 

Depression


Past Surgical History: Reports: Hx Abdominal Surgery - splenectomy, Hx 

Adenoidectomy, Hx  Section - X3, Hx Nose Surgery, Hx Tonsillectomy





- Immunizations


Immunizations up to date: Yes


Hx Diphtheria, Pertussis, Tetanus Vaccination: Yes


Hx Pneumococcal Vaccination: 10/10/11





Review of Systems





- Review of Systems


Notes: 





Constitutional: Negative for fever.


HENT: Negative for sore throat.


Eyes: Negative for visual changes.


Cardiovascular: Negative for chest pain.


Respiratory: Negative for shortness of breath.


Gastrointestinal: Positive for lower abdominal cramping


Genitourinary: Positive for vaginal discharge and dysuria


Musculoskeletal: Negative for back pain.


Skin: Negative for rash.


Neurological: Negative for headaches, weakness or numbness.





10 point ROS negative except as marked above and in HPI.





Physical Exam





- Vital signs


Vitals: 


 











Temp Pulse Resp BP Pulse Ox


 


 98.7 F   67   16   116/76   97 


 


 18 19:45  18 19:45  18 19:45  18 19:45  18 19:45











Interpretation: Normal


Notes: 





PHYSICAL EXAMINATION:





GENERAL: Well-appearing, well-nourished and in no acute distress.





HEAD: Atraumatic, normocephalic.





EYES: Pupils equal round and reactive to light, extraocular movements intact, 

sclera anicteric, conjunctiva are normal.





ENT: nares patent, oropharynx clear without exudates.  Moist mucous membranes.





NECK: Normal range of motion, supple without lymphadenopathy





LUNGS: Breath sounds clear to auscultation bilaterally and equal.  No wheezes 

rales or rhonchi.





HEART: Regular rate and rhythm without murmurs





ABDOMEN: Soft, nontender, normoactive bowel sounds.  No guarding, no rebound.  

No masses appreciated.





EXTREMITIES: Normal range of motion, no pitting or edema.  No cyanosis.





NEUROLOGICAL: No focal neurological deficits. Moves all extremities 

spontaneously and on command.





PSYCH: Normal mood, normal affect.





SKIN: Warm, Dry, normal turgor, no rashes or lesions noted.





Course





- Re-evaluation


Re-evalutation: 





18 22:57


Patient is currently pregnant and presenting with lower abdominal pain.  No 

vaginal bleeding or discharge.  Formal transvaginal ultrasound shows a viable 

intrauterine pregnancy, appropriate fetal cardiac activity.  Patient does note 

dysuria but urinalysis is not consistent with an acute urinary tract infection.

  The patient does not have any focal right lower quadrant tenderness, rebound 

or guarding to suggest acute appendicitis.  No right upper quadrant tenderness 

to suggest cholestasis of pregnancy or an acute cholecystitis.  Patient has 

tolerated oral intake here in the emergency department without difficulty.  

Patient does complain of symptoms consistent with bacterial vaginosis and does 

have bacteria on her wet mount.  She will be treated with metronidazole gel.  

Vitals are within normal limits. At this time will discharge with return 

precautions and follow-up recommendations.  Verbal discharge instructions given 

a the bedside and opportunity for questions given. Medication warnings 

reviewed. Patient is in agreement with this plan and has verbalized 

understanding of return precautions and the need for primary care follow-up in 

the next 24-72 hours.





- Vital Signs


Vital signs: 


 











Temp Pulse Resp BP Pulse Ox


 


 98.4 F   67   18   130/83 H  100 


 


 18 23:09  18 23:09  18 23:09  18 23:09  18 23:09














- Laboratory


Result Diagrams: 


 18 20:35





 18 20:35


Laboratory results interpreted by me: 


 











  18





  20:35


 


Beta HCG, Quant  80191.00 H














- Diagnostic Test


Radiology reviewed: Reports reviewed





Discharge





- Discharge


Clinical Impression: 


 First trimester pregnancy, Pregnancy related bilateral lower abdominal cramping

, antepartum, Bacterial vaginosis





Condition: Good


Disposition: HOME, SELF-CARE


Additional Instructions: 


You were seen for abdominal pain during pregnancy.  Your ultrasound and labs 

are normal today.  The exact cause your pain is uncertain but is likely related 

to your developing baby.  Please follow-up with your OB/GYN in the next 24-48 

hours.  Return to the emergency department immediately if you have worsening of 

your pain, have persistent vomiting, develop a fever of greater than 100.4F, 

begin to have vaginal bleeding, or any other symptoms that are worrisome to you.





You have an overgrowth of natural vaginal bacteria, called bacterial vaginosis. 

You are being treated with an antibiotic called metronidazole.  Do not drink 

alcohol while taking this medication.  Complete all of the antibiotic even if 

your symptoms have resolved.  Return for abdominal pain, vomiting, fever of 

greater than 101F, or any other symptoms that are worrisome to you.  Please 

follow-up with your OB/GYN or primary care doctor as needed.


Prescriptions: 


Metronidazole [Metrogel 0.75% Vaginal Gel] 1 applic PV QHS 5 Days #1 tube

## 2019-06-06 LAB
ADD MANUAL DIFF: NO
APPEARANCE UR: (no result)
APTT PPP: YELLOW S
BARBITURATES UR QL SCN: NEGATIVE
BASOPHILS # BLD AUTO: 0 10^3/UL (ref 0–0.2)
BASOPHILS NFR BLD AUTO: 0.3 % (ref 0–2)
BILIRUB UR QL STRIP: NEGATIVE
EOSINOPHIL # BLD AUTO: 0.2 10^3/UL (ref 0–0.6)
EOSINOPHIL NFR BLD AUTO: 2 % (ref 0–6)
ERYTHROCYTE [DISTWIDTH] IN BLOOD BY AUTOMATED COUNT: 14 % (ref 11.5–14)
GLUCOSE UR STRIP-MCNC: NEGATIVE MG/DL
HCT VFR BLD CALC: 36.6 % (ref 36–47)
HGB BLD-MCNC: 12.2 G/DL (ref 12–15.5)
KETONES UR STRIP-MCNC: (no result) MG/DL
LYMPHOCYTES # BLD AUTO: 3.5 10^3/UL (ref 0.5–4.7)
LYMPHOCYTES NFR BLD AUTO: 28.9 % (ref 13–45)
MCH RBC QN AUTO: 32.2 PG (ref 27–33.4)
MCHC RBC AUTO-ENTMCNC: 33.3 G/DL (ref 32–36)
MCV RBC AUTO: 97 FL (ref 80–97)
METHADONE UR QL SCN: NEGATIVE
MONOCYTES # BLD AUTO: 1.2 10^3/UL (ref 0.1–1.4)
MONOCYTES NFR BLD AUTO: 10 % (ref 3–13)
NEUTROPHILS # BLD AUTO: 7.1 10^3/UL (ref 1.7–8.2)
NEUTS SEG NFR BLD AUTO: 58.8 % (ref 42–78)
NITRITE UR QL STRIP: NEGATIVE
PCP UR QL SCN: NEGATIVE
PH UR STRIP: 6 [PH] (ref 5–9)
PLATELET # BLD: 285 10^3/UL (ref 150–450)
PROT UR STRIP-MCNC: 100 MG/DL
RBC # BLD AUTO: 3.78 10^6/UL (ref 3.72–5.28)
SP GR UR STRIP: 1.02
TOTAL CELLS COUNTED % (AUTO): 100 %
URINE AMPHETAMINES SCREEN: NEGATIVE
URINE BENZODIAZEPINES SCREEN: NEGATIVE
URINE COCAINE SCREEN: NEGATIVE
URINE MARIJUANA (THC) SCREEN: NEGATIVE
UROBILINOGEN UR-MCNC: 2 MG/DL (ref ?–2)
WBC # BLD AUTO: 12.1 10^3/UL (ref 4–10.5)

## 2019-06-07 ENCOUNTER — HOSPITAL ENCOUNTER (INPATIENT)
Dept: HOSPITAL 62 - 2S | Age: 34
LOS: 2 days | Discharge: HOME | End: 2019-06-09
Attending: OBSTETRICS & GYNECOLOGY | Admitting: OBSTETRICS & GYNECOLOGY
Payer: OTHER GOVERNMENT

## 2019-06-07 DIAGNOSIS — Z3A.40: ICD-10-CM

## 2019-06-07 DIAGNOSIS — O34.211: Primary | ICD-10-CM

## 2019-06-07 DIAGNOSIS — Z30.2: ICD-10-CM

## 2019-06-07 DIAGNOSIS — Z91.040: ICD-10-CM

## 2019-06-07 PROCEDURE — 81001 URINALYSIS AUTO W/SCOPE: CPT

## 2019-06-07 PROCEDURE — 86901 BLOOD TYPING SEROLOGIC RH(D): CPT

## 2019-06-07 PROCEDURE — 86900 BLOOD TYPING SEROLOGIC ABO: CPT

## 2019-06-07 PROCEDURE — 80307 DRUG TEST PRSMV CHEM ANLYZR: CPT

## 2019-06-07 PROCEDURE — 85025 COMPLETE CBC W/AUTO DIFF WBC: CPT

## 2019-06-07 PROCEDURE — 0UB70ZZ EXCISION OF BILATERAL FALLOPIAN TUBES, OPEN APPROACH: ICD-10-PCS | Performed by: OBSTETRICS & GYNECOLOGY

## 2019-06-07 PROCEDURE — 85027 COMPLETE CBC AUTOMATED: CPT

## 2019-06-07 PROCEDURE — 86850 RBC ANTIBODY SCREEN: CPT

## 2019-06-07 PROCEDURE — 36415 COLL VENOUS BLD VENIPUNCTURE: CPT

## 2019-06-07 PROCEDURE — 94799 UNLISTED PULMONARY SVC/PX: CPT

## 2019-06-07 PROCEDURE — 88302 TISSUE EXAM BY PATHOLOGIST: CPT

## 2019-06-07 RX ADMIN — KETOROLAC TROMETHAMINE SCH MG: 30 INJECTION, SOLUTION INTRAMUSCULAR at 17:14

## 2019-06-07 RX ADMIN — OXYCODONE AND ACETAMINOPHEN PRN TAB: 5; 325 TABLET ORAL at 19:06

## 2019-06-07 RX ADMIN — DIPHENHYDRAMINE HYDROCHLORIDE PRN MG: 25 CAPSULE ORAL at 21:47

## 2019-06-07 RX ADMIN — OXYCODONE AND ACETAMINOPHEN PRN TAB: 5; 325 TABLET ORAL at 14:59

## 2019-06-07 RX ADMIN — DOCUSATE SODIUM SCH MG: 100 CAPSULE, LIQUID FILLED ORAL at 17:14

## 2019-06-07 RX ADMIN — OXYCODONE AND ACETAMINOPHEN PRN TAB: 5; 325 TABLET ORAL at 23:32

## 2019-06-07 NOTE — OPERATIVE REPORT E
[facility]  [address] [facility] [address]  Phone: [phone]    Name:  Marielena Tavarez  Current Date: 2017  : 1989  MRN: [id number]   LEONID: [id number]    Visit Date: 2017  Address: [address] [facility] [address]  Phone: [phone]    Primary Care Provider     Name: Tj Vidaln    Phone: [phone]    The staff of [facility] would like to thank you for allowing us to assist you with your healthcare needs. The following includes patient education materials and information on how best to care for your illness/injury at home and when to see a physician. If you need to locate a Doctor or clinic close to you, please call the Doctor Referral Service at [phone]. The Service is available Monday through Thursday from 8 AM to 8 PM and  from 8 AM to 4 PM.    Patients Please Note: If further time off is required, or a medical clearance to return to work is required, it must be obtained through your primary physician.  Return to work clearances and extensions of \"Time-Off\" will not be given by the Emergency Department.     We hope that you leave our Emergency Department believing that we provided you with very good care.   Your Opinion Matters To Us  If you receive a patient satisfaction survey in the mail, please complete and return it in the postage-paid envelope.  We truly value and appreciate your feedback.  Emergency Department Care Providers   Physician:No att. providers found    Advanced Practice Provider:  Physician Assistant: CIARRA Falcon     RN_________________ ED Tech__________________ Clerical_________________         Operative Report



NAME: KIZZY RODRIGUEZ

MRN:  [id number]          : 1985 AGE:  34Y

DATE OF SURGERY: 2019              ROOM: 228



PREOPERATIVE DIAGNOSES:

1.  IUP at 40 weeks and 0 days.

2.  Previous  x3.

3.  Undesired fertility.



POSTOPERATIVE DIAGNOSES:

1.  IUP at 40 weeks and 0 days.

2.  Previous  x3.

3.  Undesired fertility.



OPERATION:

Repeat low-transverse hysterotomy  section with [place] tubal

ligation.



SURGEON:

HERMELINDO EDWARDS M.D.



ASSISTANT:

Angela Garrett 1st assist surgical tech



ANESTHESIA:

Areli Shore M.D. with a spinal.



FINDINGS:

Female infant in cephalic presentation with Apgars of 9 and 9.



SPECIMENS REMOVED:

Bilateral fallopian tube segments.



COMPLICATIONS:

None.



ESTIMATED BLOOD LOSS:

650 mL.



PROCEDURE IN DETAIL:

The patient was taken to the operating room, prepared and draped in a

normal sterile fashion in a supine position with a leftward tilt.  A

transverse skin incision was made with a scalpel and carried through the

underlying layer of fascia with the same scalpel.  The fascia was excised

in the midline and extended laterally with Mayos.  The fascia was grasped

with Kochiva and dissected from the rectus muscles sharply with the Bovie.

The rectus muscle was divided sharply again with the Bovie.  The

peritoneal cavity was entered bluntly.  The rectus muscle was divided

further using bandage scissors to have adequate uterine access, and the

bladder blade was then inserted.  The hysterotomy was nicked with a

scalpel and extended laterally with surgeon finger fracture.  The infant

was then delivered atraumatically.  The nose and mouth were suctioned with

a suction bulb, the cord was clamped and cut, and the infant was handed

off to awaiting pediatricians.  Cord blood was collected.  The placenta

was removed manually.  The uterus was exteriorized and cleared of clots

and debris.  Hysterotomy was closed with 0 Monocryl in a running, locked

fashion.  A second layer of the same suture was used to imbricate to

ensure hemostasis.  Attention was then turned to the tubal ligation.  Each

fallopian tube was grasped with a [place] and the mesosalpinx was divided.

A 3.5 cm segment of the fallopian tube was then tied off with two pieces

of 2-0 chromic and the intermediate segment was then removed using

Metzenbaums.  The pedicles were made hemostatic with the Bovie.  The

uterus was then returned to the abdomen.  Pedicles were reinspected and

found to be hemostatic.  Hysterotomy was hemostatic.  The rectus muscle

and peritoneum were reapproximated with a mattress stitch of 2-0 chromic. 

The fascia was closed with 0 Vicryl.  The subcutaneous layer was closed

with plain catgut, and the skin was closed with 4-0 Vicryl.  The patient

tolerated the procedure well.  Sponge, lap, and needle counts were correct

x2, and the patient was taken to recovery in stable condition.



DICTATING PHYSICIAN:  HERMELINDO EDWARDS M.D.





[id number]                  DT: 2019    1121

PHY#: [id number]            DD: 2019    1111

ID:   [id number]           JOB#: [id number]       ACCT: [id number]



cc:HERMELINDO EDWARDS M.D.

>

## 2019-06-08 LAB
ERYTHROCYTE [DISTWIDTH] IN BLOOD BY AUTOMATED COUNT: 14.1 % (ref 11.5–14)
HCT VFR BLD CALC: 32.2 % (ref 36–47)
HGB BLD-MCNC: 10.9 G/DL (ref 12–15.5)
MCH RBC QN AUTO: 32.5 PG (ref 27–33.4)
MCHC RBC AUTO-ENTMCNC: 33.7 G/DL (ref 32–36)
MCV RBC AUTO: 96 FL (ref 80–97)
PLATELET # BLD: 263 10^3/UL (ref 150–450)
RBC # BLD AUTO: 3.35 10^6/UL (ref 3.72–5.28)
WBC # BLD AUTO: 14.6 10^3/UL (ref 4–10.5)

## 2019-06-08 RX ADMIN — OXYCODONE AND ACETAMINOPHEN PRN TAB: 5; 325 TABLET ORAL at 09:12

## 2019-06-08 RX ADMIN — DIPHENHYDRAMINE HYDROCHLORIDE PRN MG: 25 CAPSULE ORAL at 07:02

## 2019-06-08 RX ADMIN — DOCUSATE SODIUM SCH MG: 100 CAPSULE, LIQUID FILLED ORAL at 17:24

## 2019-06-08 RX ADMIN — KETOROLAC TROMETHAMINE SCH MG: 30 INJECTION, SOLUTION INTRAMUSCULAR at 01:25

## 2019-06-08 RX ADMIN — OXYCODONE AND ACETAMINOPHEN PRN TAB: 5; 325 TABLET ORAL at 20:50

## 2019-06-08 RX ADMIN — OXYCODONE AND ACETAMINOPHEN PRN TAB: 5; 325 TABLET ORAL at 16:31

## 2019-06-08 RX ADMIN — DOCUSATE SODIUM SCH MG: 100 CAPSULE, LIQUID FILLED ORAL at 09:13

## 2019-06-08 RX ADMIN — Medication SCH CAP: at 09:13

## 2019-06-08 RX ADMIN — OXYCODONE AND ACETAMINOPHEN PRN TAB: 5; 325 TABLET ORAL at 04:18

## 2019-06-08 RX ADMIN — IBUPROFEN PRN MG: 800 TABLET, FILM COATED ORAL at 23:04

## 2019-06-08 RX ADMIN — DIPHENHYDRAMINE HYDROCHLORIDE PRN MG: 25 CAPSULE ORAL at 16:31

## 2019-06-08 NOTE — PDOC PROGRESS REPORT
Subjective-OB


Progress Note for:: 19


Subjective: 


Pt doing well, bonding with baby.  She denies heavy bleeding, reports regular 

diet and + flatus.  No difficulty voiding. 








Physical Exam (OB)


Vital Signs: 


                                        











Temp Pulse Resp BP Pulse Ox


 


 98.1 F   80   18   131/90 H  100 


 


 19 11:14  19 11:14  19 11:14  19 11:14  19 11:14








                                 Intake & Output











 19





 06:59 06:59 06:59


 


Intake Total  450 480


 


Output Total  1350 


 


Balance  -900 480


 


Weight 92.08 kg 92.08 kg 














- PIH/Pre-Eclampsia


Headache: Absent


Epigastric Pain: No


Visual Changes: No





- 


Dressing Removed: No


Incision: Dressing, Draining





- Lochia


Lochia Amount: Scant < 10 ml


Lochia Color: Rubra/Red





- Abdomen


Description: Tender


Hernia Present: No


Fundal Description: Firm


Fundal Height: u/u - u/2





Objective-Diagnostic


Laboratory: 


                                        





                                 19 07:24 





                                        











  19





  07:24


 


WBC  14.6 H


 


RBC  3.35 L


 


Hgb  10.9 L


 


Hct  32.2 L


 


MCV  96


 


MCH  32.5


 


MCHC  33.7


 


RDW  14.1 H


 


Plt Count  263














Assessment and Plan(PN)





- Assessment and Plan


(1) Status post repeat low transverse  section


Is this a current diagnosis for this admission?: Yes   





- Time Spent with Patient


Time with patient: Less than 15 minutes


Medications reviewed and adjusted accordingly: Yes





- Disposition


Anticipated Discharge: Home


Within: within 24 hours

## 2019-06-09 VITALS — DIASTOLIC BLOOD PRESSURE: 83 MMHG | SYSTOLIC BLOOD PRESSURE: 119 MMHG

## 2019-06-09 RX ADMIN — DOCUSATE SODIUM SCH MG: 100 CAPSULE, LIQUID FILLED ORAL at 09:25

## 2019-06-09 RX ADMIN — IBUPROFEN PRN MG: 800 TABLET, FILM COATED ORAL at 06:11

## 2019-06-09 RX ADMIN — OXYCODONE AND ACETAMINOPHEN PRN TAB: 5; 325 TABLET ORAL at 04:36

## 2019-06-09 RX ADMIN — OXYCODONE AND ACETAMINOPHEN PRN TAB: 5; 325 TABLET ORAL at 12:51

## 2019-06-09 RX ADMIN — IBUPROFEN PRN MG: 800 TABLET, FILM COATED ORAL at 12:53

## 2019-06-09 RX ADMIN — Medication SCH CAP: at 09:25

## 2019-06-09 NOTE — PDOC DISCHARGE SUMMARY
Final Diagnosis


Discharge Date: 19





- Final Diagnosis


(1) Status post repeat low transverse  section


Is this a current diagnosis for this admission?: Yes   





Discharge Data





- Discharge Medication


Home Medications: 








Prenatal Vit No.129/Iron/Folic [Prenatal Tablet] 1 each PO DAILY #30 tablet 

17 


Doxylamine Succinate/Vit B6 [Diclegis Dr 10-10 mg Tablet] 1 each PO PRN PRN 

19 








Reason(s) for Admission: Ceasarean Section-Repeat


Prenatal Procedures: None


Intrapartum Procedure(s): : Low Cervical, Transverse





- Diagnosis Test


Laboratory: 


                                        











Temp Pulse Resp BP Pulse Ox


 


 98.1 F   80   18   131/90 H  100 


 


 19 11:14  19 11:14  19 11:14  19 11:14  19 11:14








                                        











  19





  08:45 09:24 07:24


 


RBC   3.78  3.35 L


 


Hgb   12.2  10.9 L


 


Hct   36.6  32.2 L


 


Urine Opiates Screen  NEGATIVE  














- Discharge information/Instructions


Discharge Activity: Balance Activity w/Rest, No Lifting Over 10 Pounds, No 

Lifting/Push/Pulling, Pelvic Rest


Discharge Diet: Regular


Disposition: HOME, SELF-CARE


Follow up with: [facility]


in: 4, Weeks

## 2019-12-05 ENCOUNTER — HOSPITAL ENCOUNTER (EMERGENCY)
Dept: HOSPITAL 62 - ER | Age: 34
Discharge: HOME | End: 2019-12-05
Payer: OTHER GOVERNMENT

## 2019-12-05 VITALS — DIASTOLIC BLOOD PRESSURE: 76 MMHG | SYSTOLIC BLOOD PRESSURE: 139 MMHG

## 2019-12-05 DIAGNOSIS — B97.89: ICD-10-CM

## 2019-12-05 DIAGNOSIS — Z91.040: ICD-10-CM

## 2019-12-05 DIAGNOSIS — R05: ICD-10-CM

## 2019-12-05 DIAGNOSIS — R09.89: ICD-10-CM

## 2019-12-05 DIAGNOSIS — M79.10: ICD-10-CM

## 2019-12-05 DIAGNOSIS — J06.9: Primary | ICD-10-CM

## 2019-12-05 DIAGNOSIS — F31.9: ICD-10-CM

## 2019-12-05 DIAGNOSIS — R09.81: ICD-10-CM

## 2019-12-05 DIAGNOSIS — Z88.8: ICD-10-CM

## 2019-12-05 DIAGNOSIS — Z79.899: ICD-10-CM

## 2019-12-05 PROCEDURE — 99283 EMERGENCY DEPT VISIT LOW MDM: CPT

## 2019-12-05 NOTE — ER DOCUMENT REPORT
Entered by COOPER MONTAGUE SCRIBE  19 





Acting as scribe for:GRACE GALLEGO MD





ED General





- General


Chief Complaint: Cold Symptoms


Stated Complaint: COUGH,CONGESTION


Time Seen by Provider: 19 07:07


Primary Care Provider: 


MAU JAMES MD [Primary Care Provider] - Follow up as needed


Mode of Arrival: Ambulatory


Information source: Patient


Notes: 





This 34 year old female patient presents to the emergency department today with 

complaints of upper respiratory symptoms for the last few days. Patient 

complains of nasal congestion and a cough. Patient states her sputum has had 

color once which was right after she woke up. Patient complains of generalized 

myalgias and chest soreness when she coughs. Patient denies fevers.


TRAVEL OUTSIDE OF THE [place] IN LAST 30 DAYS: No





- Related Data


Allergies/Adverse Reactions: 


                                        





latex Allergy (Verified 19 07:25)


   


aspirin Adverse Reaction (Verified 19 07:25)


   VOMITING








Home Medications: zyprexa





Past Medical History





- General


Information source: Patient





- Social History


Smoking Status: Never Smoker


Cigarette use (# per day): No


Frequency of alcohol use: None


Drug Abuse: None


Lives with: Family


Family History: Reviewed & Not Pertinent


Patient has suicidal ideation: No


Patient has homicidal ideation: No





- Past Medical History


Cardiac Medical History: Reports: Hx Hypertension


Pulmonary Medical History: Reports: Hx Bronchitis, Hx Pneumonia


Neurological Medical History: Reports: Hx Migraine


Renal/ Medical History: Reports: Hx Kidney Stones - 2016, Hx Pelvic 

Inflammatory Disease - 2013 and 2018


GI Medical History: Reports: Hx Gastroesophageal Reflux Disease


Musculoskeletal Medical History: Reports Hx Musculoskeletal Trauma


Psychiatric Medical History: Reports: Hx Anxiety, Hx Bipolar Disorder, Hx Depre

ssion


Past Surgical History: Reports: Hx Abdominal Surgery - splenectomy, Hx 

Adenoidectomy, Hx  Section - X3, Hx Nose Surgery, Hx Tonsillectomy





- Immunizations


Immunizations up to date: Yes


Hx Diphtheria, Pertussis, Tetanus Vaccination: Yes


Hx Pneumococcal Vaccination: 10/10/11





Review of Systems





- Review of Systems


Constitutional: No symptoms reported


EENT: No symptoms reported


Cardiovascular: No symptoms reported


Respiratory: See HPI, Cough


Gastrointestinal: No symptoms reported


Genitourinary: No symptoms reported


Female Genitourinary: No symptoms reported


Musculoskeletal: See HPI, Muscle pain, Muscle stiffness


Skin: No symptoms reported


Hematologic/Lymphatic: No symptoms reported


Neurological/Psychological: No symptoms reported


-: Yes All other systems reviewed and negative





Physical Exam





- Vital signs


Vitals: 


                                        











Temp Pulse Resp BP Pulse Ox


 


 98.2 F   99   20   123/81   98 


 


 19 06:16  19 06:16  19 06:16  19 06:16  19 06:16














- Notes


Notes: 





Physical Exam:


 


General: Alert, appears well. 


 


HEENT: Normocephalic. Atraumatic. PERRL. Extraocular movements intact. 

Oropharynx clear.  TMs are retracted bilaterally.


 


Neck: Supple. Non-tender.


 


Respiratory: No respiratory distress.  Coarse breath sounds with forced cough 

bilaterally.


 


Cardiovascular: Regular rate and rhythm. 


 


Abdominal: Normal Inspection. Non-tender. No distension. Normal Bowel Sounds. 


 


Back: No gross abnormalities. 


 


Extremities: Moves all four extremities.


Upper extremities: Normal inspection. Normal ROM.  


Lower extremities: Normal inspection. No edema. Normal ROM.


 


Neurological: Normal cognition. AAOx4. Normal speech.  


 


Psychological: Normal affect. Normal Mood. 


 


Skin: Warm. Dry. Normal color.





Course





- Vital Signs


Vital signs: 


                                        











Temp Pulse Resp BP Pulse Ox


 


 98.2 F   99   20   123/81   98 


 


 19 06:16  19 06:16  19 06:16  19 06:16  19 06:16














Discharge





- Discharge


Clinical Impression: 


 Viral upper respiratory tract infection with cough





Condition: Stable


Disposition: HOME, SELF-CARE


Additional Instructions: 


Upper Respiratory Illness





     You have a viral infection of the respiratory passages -- a "cold."  This 

common infection causes nasal congestion, drainage, and often sore throat and 

cough.  It is caused by a virus and is highly contagious.  The disease usually 

lasts a week or more, though the worst symptoms are usually over in 3 or 4 days.


     There is no "cure" for the viral infection -- it must run its course.  If 

there is a complication, such as bacterial infection in the nose, sinuses, 

middle ear, or bronchial tubes, antibiotics may be required, but antibiotics 

won't affect the virus.


     If you smoke, you should STOP!!  Drink plenty of fluids.  A humidifier may 

help.  An expectorant medication or decongestant may make you more comfortable. 

Use acetaminophen or ibuprofen for fever or aches.


     See the doctor if fever persists over two or three days, if there is any 

significant worsening of your symptoms, or if you simply fail to improve as 

expected.








 

********************************************************************************


*************





It is not unusual for your viral upper respiratory tract infection symptoms to 

last 2 to 3 weeks, even if you get plenty of rest and drink plenty of fluids.





Take the medication as prescribed to help control your cough.


Also take the generic version of Delsym DM to help control your cough.


Drink plenty fluids and get plenty of rest.


Take Tylenol and ibuprofen for pain or fever as needed.





Follow-up with your primary care provider if not improving.


Be sure to get a flu shot this year.





RETURN TO THE EMERGENCY ROOM IF ANY NEW OR WORSENING SYMPTOMS.








Prescriptions: 


Benzonatate [Tessalon Perles 100 mg Capsule] 100 mg PO ASDIR PRN #30 capsule


 PRN Reason: 


Referrals: 


MAU JAMES MD [Primary Care Provider] - Follow up as needed


Rakeshibcalvin Attestation: 





19 07:30


I personally performed the services described in the documentation, reviewed and

edited the documentation which was dictated to the scribe in my presence, and it

accurately records my words and actions.





I personally performed the services described in the documentation, reviewed and

edited the documentation which was dictated to the scribe in my presence, and it

accurately records my words and actions.